# Patient Record
Sex: FEMALE | Race: WHITE | NOT HISPANIC OR LATINO | URBAN - METROPOLITAN AREA
[De-identification: names, ages, dates, MRNs, and addresses within clinical notes are randomized per-mention and may not be internally consistent; named-entity substitution may affect disease eponyms.]

---

## 2020-07-25 ENCOUNTER — TELEPHONE ENCOUNTER (OUTPATIENT)
Dept: URBAN - METROPOLITAN AREA CLINIC 13 | Facility: CLINIC | Age: 80
End: 2020-07-25

## 2020-07-25 RX ORDER — FENOFIBRATE 150 MG/1
TAKE 1 CAPSULE DAILY CAPSULE ORAL
Refills: 0 | OUTPATIENT
Start: 2010-07-26 | End: 2010-08-04

## 2020-07-25 RX ORDER — VIT A/VIT C/VIT E/ZINC/COPPER 4296-226
TAKE 1 CAPSULE DAILY CAPSULE ORAL
Refills: 0 | OUTPATIENT
Start: 2010-07-26 | End: 2010-08-04

## 2020-07-25 RX ORDER — CALCIUM CITRATE/VITAMIN D3 315MG-6.25
TAKE 1 TABLET DAILY TABLET ORAL
Refills: 0 | OUTPATIENT
Start: 2010-07-26 | End: 2010-08-04

## 2020-07-26 ENCOUNTER — TELEPHONE ENCOUNTER (OUTPATIENT)
Dept: URBAN - METROPOLITAN AREA CLINIC 13 | Facility: CLINIC | Age: 80
End: 2020-07-26

## 2020-07-26 RX ORDER — VIT A/VIT C/VIT E/ZINC/COPPER 4296-226
TAKE 1 CAPSULE DAILY PATIENT STATES DOES NOT KNOW DOSAGE CAPSULE ORAL
Refills: 0 | Status: ACTIVE | COMMUNITY
Start: 2010-08-04

## 2020-07-26 RX ORDER — FENOFIBRATE 150 MG/1
TAKE 1 CAPSULE DAILY PATIENT STATES DOES KNOW DOSAGE CAPSULE ORAL
Refills: 0 | Status: ACTIVE | COMMUNITY
Start: 2010-08-04

## 2020-07-26 RX ORDER — CALCIUM CITRATE/VITAMIN D3 315MG-6.25
TAKE  TABLET DAILY PATIENT STATES DOES NOT KNOW DOSAGE TABLET ORAL
Refills: 0 | Status: ACTIVE | COMMUNITY
Start: 2010-08-04

## 2021-07-03 ENCOUNTER — HOSPITAL ENCOUNTER (EMERGENCY)
Dept: HOSPITAL 43 - DL.ED | Age: 81
Discharge: HOME | End: 2021-07-03
Payer: MEDICARE

## 2021-07-03 DIAGNOSIS — N39.0: Primary | ICD-10-CM

## 2021-07-03 PROCEDURE — 87186 SC STD MICRODIL/AGAR DIL: CPT

## 2021-07-03 PROCEDURE — 99283 EMERGENCY DEPT VISIT LOW MDM: CPT

## 2021-07-03 PROCEDURE — 87086 URINE CULTURE/COLONY COUNT: CPT

## 2021-07-03 PROCEDURE — 81001 URINALYSIS AUTO W/SCOPE: CPT

## 2021-07-03 PROCEDURE — 87088 URINE BACTERIA CULTURE: CPT

## 2021-07-03 PROCEDURE — 96372 THER/PROPH/DIAG INJ SC/IM: CPT

## 2021-07-03 NOTE — EDM.PDOC
ED HPI GENERAL MEDICAL PROBLEM





- General


Chief Complaint: Genitourinary Problem


Stated Complaint: POSSIBLE UTI


Time Seen by Provider: 07/03/21 13:05


Source of Information: Reports: Patient





- History of Present Illness


INITIAL COMMENTS - FREE TEXT/NARRATIVE: 





Patient is a unfortunate 80-year-old  female who presents emerged from 

today with complaint of dysuria frequency urgency.  Patient reports that 

symptoms started 3 days ago and the dysuria worsened last night so she presented

emerged part today for further evaluation she has had no fever no chills no 

nausea no vomiting no chest pain or shortness of breath no vaginal discharge no 

foul odors


  ** Uterine


Pain Score (Numeric/FACES): 3





- Related Data


                                    Allergies











Allergy/AdvReac Type Severity Reaction Status Date / Time


 


No Known Allergies Allergy   Verified 07/03/21 12:55











Home Meds: 


                                    Home Meds





Phenazopyridine HCl [Pyridium] 200 mg PO TID PRN #9 tablet 07/03/21 [Rx]


cephALEXin [Keflex] 500 mg PO QID #28 cap 07/03/21 [Rx]











ED ROS GENERAL





- Review of Systems


Review Of Systems: See Below


Constitutional: Denies: Fever, Chills


: Reports: Dysuria, Frequency, Urgency





ED EXAM, RENAL/





- Physical Exam


Exam: See Below


Exam Limited By: No Limitations


General Appearance: Alert, WD/WN, Mild Distress


Respiratory/Chest: No Respiratory Distress, Lungs Clear, Normal Breath Sounds, 

No Accessory Muscle Use, Chest Non-Tender


Cardiovascular: Normal Peripheral Pulses, Regular Rate, Rhythm, No Edema, No 

Gallop, No JVD, No Murmur, No Rub


GI/Abdominal: Normal Bowel Sounds, Soft, No Organomegaly, No Distention, No 

Abnormal Bruit, No Mass, Tender (mild suprapubic)


Back Exam: Normal Inspection, Full Range of Motion, NT


Extremities: Normal Inspection, Normal Range of Motion, Non-Tender, Normal 

Capillary Refill, No Pedal Edema


Neurological: Alert, Oriented


Skin Exam: Warm, Dry, No Rash





Course





- Vital Signs


Text/Narrative:: 





The patient has a urinary tract infection will be given 1 injection of Rocephin,

 will be placed on Keflex and Pyridium as an outpatient will have patient 

follow-up with PCP next week or return to the emergency department for any 

worsening condition


Last Recorded V/S: 


                                Last Vital Signs











Temp  98.0 F   07/03/21 12:56


 


Pulse  80   07/03/21 12:56


 


Resp  18   07/03/21 12:56


 


BP  125/65   07/03/21 12:56


 


Pulse Ox  99   07/03/21 12:56














- Orders/Labs/Meds


Orders: 


                               Active Orders 24 hr











 Category Date Time Status


 


 CULTURE URINE [RM] Stat Lab  07/03/21 12:53 Received


 


 cefTRIAXone 1 GM,Lidocaine 1% 2.1 ML Med  07/03/21 13:31 Ordered





 cefTRIAXone [Rocephin] 1 gm   





 Lidocaine 1% [Xylocaine-MPF 1%] 2.1 ml   





 IM ONETIME   











Labs: 


                                Laboratory Tests











  07/03/21 Range/Units





  12:53 


 


Urine Color  Yellow  (YELLOW)  


 


Urine Appearance  Slightly cloudy  (CLEAR)  


 


Urine pH  5.5  (5.0-9.0)  


 


Ur Specific Gravity  1.025  (1.005-1.030)  


 


Urine Protein  Negative  (NEGATIVE)  


 


Urine Glucose (UA)  Negative  (NEGATIVE)  


 


Urine Ketones  Negative  (NEGATIVE)  


 


Urine Occult Blood  Trace-intact H  (NEGATIVE)  


 


Urine Nitrite  Positive H  (NEGATIVE)  


 


Urine Bilirubin  Negative  (NEGATIVE)  


 


Urine Urobilinogen  1.0  (0.2-1.0)  mg/dL


 


Ur Leukocyte Esterase  Large H  (NEGATIVE)  


 


Urine RBC  0-5  /HPF


 


Urine WBC  >100 H  (0-5/HPF)  /HPF


 


Ur Epithelial Cells  Rare  (NOT SEEN)  /HPF


 


Urine Bacteria  Many H  (0-FEW/HPF)  /HPF


 


Urine Mucus  Not seen  (NOT SEEN)  /LPF














Departure





- Departure


Time of Disposition: 13:32


Disposition: Home, Self-Care 01


Condition: Good


Clinical Impression: 


 UTI, Urinary tract infectious disease








- Discharge Information


*PRESCRIPTION DRUG MONITORING PROGRAM REVIEWED*: No


*COPY OF PRESCRIPTION DRUG MONITORING REPORT IN PATIENT [facility]: No


Prescriptions: 


cephALEXin [Keflex] 500 mg PO QID #28 cap


Phenazopyridine HCl [Pyridium] 200 mg PO TID PRN #9 tablet


 PRN Reason: Pain


Instructions:  Urinary Tract Infection, Adult


Forms:  ED Department Discharge


Additional Instructions: 


Home, rest, adequate fluids, return as needed for worsening condition





Sepsis Event Note (ED)





- Evaluation


Sepsis Screening Result: No Definite Risk





- Focused Exam


Vital Signs: 


                                   Vital Signs











  Temp Pulse Resp BP Pulse Ox


 


 07/03/21 12:56  98.0 F  80  18  125/65  99














- My Orders


Last 24 Hours: 


My Active Orders





07/03/21 12:53


CULTURE URINE [RM] Stat 





07/03/21 13:31


cefTRIAXone 1 GM,Lidocaine 1% 2.1 ML cefTRIAXone [Rocephin] 1 gm Lidocaine 1% 

[Xylocaine-MPF 1%] 2.1 ml IM ONETIME 














- Assessment/Plan


Last 24 Hours: 


My Active Orders





07/03/21 12:53


CULTURE URINE [RM] Stat 





07/03/21 13:31


cefTRIAXone 1 GM,Lidocaine 1% 2.1 ML cefTRIAXone [Rocephin] 1 gm Lidocaine 1% 

[Xylocaine-MPF 1%] 2.1 ml IM ONETIME

## 2022-01-07 ENCOUNTER — OFFICE VISIT (OUTPATIENT)
Dept: URBAN - METROPOLITAN AREA CLINIC 113 | Facility: CLINIC | Age: 82
End: 2022-01-07